# Patient Record
Sex: MALE | Race: WHITE | NOT HISPANIC OR LATINO | URBAN - METROPOLITAN AREA
[De-identification: names, ages, dates, MRNs, and addresses within clinical notes are randomized per-mention and may not be internally consistent; named-entity substitution may affect disease eponyms.]

---

## 2020-03-18 ENCOUNTER — EMERGENCY (EMERGENCY)
Facility: HOSPITAL | Age: 45
LOS: 1 days | Discharge: HOME | End: 2020-03-18
Admitting: EMERGENCY MEDICINE
Payer: COMMERCIAL

## 2020-03-18 VITALS
DIASTOLIC BLOOD PRESSURE: 72 MMHG | WEIGHT: 176.37 LBS | TEMPERATURE: 98 F | RESPIRATION RATE: 15 BRPM | HEIGHT: 67 IN | HEART RATE: 65 BPM | SYSTOLIC BLOOD PRESSURE: 111 MMHG | OXYGEN SATURATION: 99 %

## 2020-03-18 DIAGNOSIS — S60.511A ABRASION OF RIGHT HAND, INITIAL ENCOUNTER: ICD-10-CM
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
## 2020-03-18 DIAGNOSIS — S61.210A LACERATION WITHOUT FOREIGN BODY OF RIGHT INDEX FINGER WITHOUT DAMAGE TO NAIL, INITIAL ENCOUNTER: ICD-10-CM

## 2020-03-18 DIAGNOSIS — S60.512A ABRASION OF LEFT HAND, INITIAL ENCOUNTER: ICD-10-CM
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
## 2020-03-18 PROCEDURE — 12001 RPR S/N/AX/GEN/TRNK 2.5CM/<: CPT

## 2020-03-18 PROCEDURE — 99283 EMERGENCY DEPT VISIT LOW MDM: CPT | Mod: 25

## 2020-03-18 NOTE — ED PROVIDER NOTE - PHYSICAL EXAMINATION
CONSTITUTIONAL: Well-developed; well-nourished; in no acute distress.  	bilateral upper extremities: +1cm superficial laceration to right 2nd digit, no active bleeding or fb, no sensory or motor deficits, tendon function intact. good ROM refill. +minor abrasions to hands  +small superficial flap to lateral right thumb, no fb or active bleeding, full ROM no visible tendon or nerve or bone. good cap refill. nvi. distal pulses intact.   PSYCH: Cooperative, appropriate.

## 2020-03-18 NOTE — ED PROVIDER NOTE - NSFOLLOWUPINSTRUCTIONS_ED_ALL_ED_FT
Keep wounds clean and dry  Return in 10 days for suture removal    RETURN TO THE EMERGENCY DEPARTMENT FOR WORSENING PAIN, FEVER, PUS, REDNESS OR ANY CONCERNS.

## 2020-03-18 NOTE — ED PROVIDER NOTE - CLINICAL SUMMARY MEDICAL DECISION MAKING FREE TEXT BOX
wounds cleansed, nvi, lac repair, tetanus utd, wound care discussed, suture removal in 10 days, return precautions discussed

## 2020-03-18 NOTE — ED ADULT NURSE NOTE - OBJECTIVE STATEMENT
45 y/o male c/o  lacerations to Rt point finger, left thumb and mid finger. Pt states he was cleaning a piece of glass that shattered on his hands, pt states he washed hands right away with hydrogen peroxide, neosporin ointment was applied, and Up to date to tetanus shot.

## 2020-03-18 NOTE — ED PROVIDER NOTE - PATIENT PORTAL LINK FT
You can access the FollowMyHealth Patient Portal offered by [facility] by registering at the following website: [email or web address]. By joining [facility]’s FollowMyHealth portal, you will also be able to view your health information using other applications (apps) compatible with our system.

## 2020-03-18 NOTE — ED PROVIDER NOTE - OBJECTIVE STATEMENT
43 yo male here with lacerations to bilateral hands sustained while washing candle holders, candle medrano glass broken sustained several cuts to hands a few hours ago. tetanus utd. no other injuries.